# Patient Record
Sex: MALE | Race: WHITE | NOT HISPANIC OR LATINO
[De-identification: names, ages, dates, MRNs, and addresses within clinical notes are randomized per-mention and may not be internally consistent; named-entity substitution may affect disease eponyms.]

---
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
## 2023-01-09 ENCOUNTER — OFFICE VISIT (OUTPATIENT)
Dept: INTEGRATIVE MEDICINE | Facility: CLINIC | Age: 23
End: 2023-01-09
Payer: MEDICAID

## 2023-01-09 VITALS
SYSTOLIC BLOOD PRESSURE: 110 MMHG | HEART RATE: 84 BPM | OXYGEN SATURATION: 97 % | DIASTOLIC BLOOD PRESSURE: 70 MMHG | BODY MASS INDEX: 24 KG/M2 | WEIGHT: 194.81 LBS

## 2023-01-09 DIAGNOSIS — R53.83 OTHER FATIGUE: ICD-10-CM

## 2023-01-09 DIAGNOSIS — R68.82 LOW LIBIDO: ICD-10-CM

## 2023-01-09 DIAGNOSIS — L21.9 SEBORRHEA: Primary | ICD-10-CM

## 2023-01-09 DIAGNOSIS — F41.9 ANXIETY: ICD-10-CM

## 2023-01-09 PROCEDURE — 99214 OFFICE O/P EST MOD 30 MIN: CPT | Performed by: FAMILY MEDICINE

## 2023-01-09 PROCEDURE — 3074F SYST BP LT 130 MM HG: CPT | Performed by: FAMILY MEDICINE

## 2023-01-09 PROCEDURE — 3078F DIAST BP <80 MM HG: CPT | Performed by: FAMILY MEDICINE

## 2023-01-09 RX ORDER — KETOCONAZOLE 20 MG/G
1 CREAM TOPICAL 2 TIMES DAILY
Qty: 60 G | Refills: 0 | Status: SHIPPED | OUTPATIENT
Start: 2023-01-09 | End: 2023-03-10

## 2023-04-26 ENCOUNTER — OFFICE VISIT (OUTPATIENT)
Dept: FAMILY MEDICINE CLINIC | Facility: CLINIC | Age: 23
End: 2023-04-26
Payer: MEDICAID

## 2023-04-26 VITALS
SYSTOLIC BLOOD PRESSURE: 110 MMHG | BODY MASS INDEX: 24.25 KG/M2 | WEIGHT: 195 LBS | TEMPERATURE: 98 F | OXYGEN SATURATION: 98 % | HEIGHT: 75 IN | HEART RATE: 92 BPM | RESPIRATION RATE: 16 BRPM | DIASTOLIC BLOOD PRESSURE: 70 MMHG

## 2023-04-26 DIAGNOSIS — R21 RASH OF FACE: ICD-10-CM

## 2023-04-26 DIAGNOSIS — R05.1 ACUTE COUGH: ICD-10-CM

## 2023-04-26 DIAGNOSIS — R09.81 NASAL CONGESTION: ICD-10-CM

## 2023-04-26 DIAGNOSIS — H93.8X2 EAR CONGESTION, LEFT: Primary | ICD-10-CM

## 2023-04-26 PROCEDURE — 3008F BODY MASS INDEX DOCD: CPT | Performed by: STUDENT IN AN ORGANIZED HEALTH CARE EDUCATION/TRAINING PROGRAM

## 2023-04-26 PROCEDURE — 3074F SYST BP LT 130 MM HG: CPT | Performed by: STUDENT IN AN ORGANIZED HEALTH CARE EDUCATION/TRAINING PROGRAM

## 2023-04-26 PROCEDURE — 99213 OFFICE O/P EST LOW 20 MIN: CPT | Performed by: STUDENT IN AN ORGANIZED HEALTH CARE EDUCATION/TRAINING PROGRAM

## 2023-04-26 PROCEDURE — 3078F DIAST BP <80 MM HG: CPT | Performed by: STUDENT IN AN ORGANIZED HEALTH CARE EDUCATION/TRAINING PROGRAM

## 2023-04-26 RX ORDER — FLUTICASONE PROPIONATE 50 MCG
2 SPRAY, SUSPENSION (ML) NASAL DAILY
Qty: 1 EACH | Refills: 1 | Status: SHIPPED | OUTPATIENT
Start: 2023-04-26

## 2023-05-05 ENCOUNTER — TELEPHONE (OUTPATIENT)
Dept: INTEGRATIVE MEDICINE | Facility: CLINIC | Age: 23
End: 2023-05-05

## 2023-05-05 NOTE — TELEPHONE ENCOUNTER
Please fax labs to [address] [facility]  PH#[phone]  FAK#[phone]    [facility]

## 2023-05-11 ENCOUNTER — TELEMEDICINE (OUTPATIENT)
Dept: INTEGRATIVE MEDICINE | Facility: CLINIC | Age: 23
End: 2023-05-11
Payer: MEDICAID

## 2023-05-11 DIAGNOSIS — R53.83 OTHER FATIGUE: ICD-10-CM

## 2023-05-11 DIAGNOSIS — R10.9 ABDOMINAL PAIN, UNSPECIFIED ABDOMINAL LOCATION: Primary | ICD-10-CM

## 2023-05-11 PROBLEM — N44.00 LEFT TESTICULAR TORSION: Status: ACTIVE | Noted: 2021-09-14

## 2023-05-11 LAB
FOLATE, SERUM: 12.4 NG/ML
FREE TESTOSTERONE: 82.6 PG/ML (ref 35–155)
MAGNESIUM: 1.9 MG/DL (ref 1.5–2.5)
TESTOSTERONE, TOTAL,$/MS/MS: 565 NG/DL (ref 250–1100)
VITAMIN B12: 583 PG/ML (ref 200–1100)
VITAMIN D, 25-OH, TOTAL: 82 NG/ML (ref 30–100)

## 2023-05-11 PROCEDURE — 99214 OFFICE O/P EST MOD 30 MIN: CPT | Performed by: FAMILY MEDICINE

## 2024-07-15 ENCOUNTER — HOSPITAL ENCOUNTER (OUTPATIENT)
Dept: GENERAL RADIOLOGY | Age: 24
Discharge: HOME | End: 2024-07-15
Attending: NURSE PRACTITIONER
Payer: MEDICAID

## 2024-07-15 ENCOUNTER — OFFICE VISIT (OUTPATIENT)
Dept: FAMILY MEDICINE CLINIC | Facility: CLINIC | Age: 24
End: 2024-07-15
Payer: MEDICAID

## 2024-07-15 VITALS
DIASTOLIC BLOOD PRESSURE: 60 MMHG | HEART RATE: 68 BPM | WEIGHT: 198.38 LBS | OXYGEN SATURATION: 96 % | TEMPERATURE: 98 F | BODY MASS INDEX: 24.66 KG/M2 | HEIGHT: 75 IN | SYSTOLIC BLOOD PRESSURE: 108 MMHG

## 2024-07-15 DIAGNOSIS — R11.2 NAUSEA AND VOMITING, UNSPECIFIED VOMITING TYPE: Primary | ICD-10-CM

## 2024-07-15 DIAGNOSIS — R19.8 ALTERNATING CONSTIPATION AND DIARRHEA: ICD-10-CM

## 2024-07-15 DIAGNOSIS — R11.2 NAUSEA AND VOMITING, UNSPECIFIED VOMITING TYPE: ICD-10-CM

## 2024-07-15 DIAGNOSIS — F41.1 GENERALIZED ANXIETY DISORDER: ICD-10-CM

## 2024-07-15 PROCEDURE — 74018 RADEX ABDOMEN 1 VIEW: CPT | Performed by: NURSE PRACTITIONER

## 2024-07-15 NOTE — PATIENT INSTRUCTIONS
Here is a summary of the 4-7-8 breathing technique we discussed today.    Sit or lay in a comfortable position with your eyes closed and hands on your belly.  Breathe in through your nose for 4 slow counts, feeling your belly (not your chest) rise with breath.  Hold for 7 counts.  Breathe out through your mouth for 8 slow counts, feeling your belly fall.  Repeat for 2-5 minutes or until feeling calmer.

## 2024-07-15 NOTE — PROGRESS NOTES
Chief Complaint:   Chief Complaint   Patient presents with    Vomiting       HPI:   This is a 24 year old male coming in with his mom for ongoing stomach issues x years. He has anxiety, and reports this causes frequent stomach aches. About 5 days ago, he ate Pringles late in the evening and woke up every 2 hours that night to vomit. He is frequently nauseated. Alternates between constipation and diarrhea. Stomach pain is \"hard to explain\" and moves around, earlier today he had it in lower abdomen. No blood in stool. Last BM was yesterday, sometimes has to strain. Denies persistent epigastric pain, frequent belching/burping, heartburn or reflux. Mom reports he has been very stressed, not talking to a counselor or managing stress right now. Eats a low-fiber diet. Mom is worried about an ulcer. Has not tried medications for symptoms. Has tried ashwaghanda for anxiety.    Results for orders placed or performed in visit on 05/05/23   Magnesium   Result Value Ref Range    MAGNESIUM 1.9 1.5 - 2.5 mg/dL   Folic Acid Serum (Folate)   Result Value Ref Range    FOLATE, SERUM 12.4 ng/mL   Vitamin B12   Result Value Ref Range    VITAMIN B12 583 200 - 1,100 pg/mL   Vitamin D, 25-Hydroxy   Result Value Ref Range    VITAMIN D, 25-OH, TOTAL 82 30 - 100 ng/mL   Testosterone,Total, Free, and Weak Binding plus Sex Hormone Binding Globulin   Result Value Ref Range    TESTOSTERONE, TOTAL,$LC/MS/ 250 - 1,100 ng/dL    FREE TESTOSTERONE 82.6 35.0 - 155.0 pg/mL             Past Medical History:    Anxiety    Depression     Past Surgical History:   Procedure Laterality Date    Other      testicular torsion     Social History:  Social History     Socioeconomic History    Marital status: Single   Tobacco Use    Smoking status: Former     Types: Cigarettes    Smokeless tobacco: Never   Vaping Use    Vaping status: Some Days   Substance and Sexual Activity    Alcohol use: Yes     Comment: rarely    Drug use: Not Currently     Family  History:  Family History   Problem Relation Age of Onset    High Cholesterol Father     Anxiety Father     Other (hashimoto) Mother     Prostate Cancer Maternal Grandfather     Other (aneurysm) Maternal Grandfather      Allergies:  No Known Allergies  Current Meds:  No current outpatient medications on file.      Counseling given: No       REVIEW OF SYSTEMS:   CONSTITUTIONAL:  Denies unusual weight gain/loss, fever, chills, or fatigue.  CARDIOVASCULAR:  Denies chest pain, palpitations, edema, dyspnea on exertion or at rest.  RESPIRATORY:  Denies shortness of breath, wheezing, cough or sputum.  GASTROINTESTINAL:  See HPI. Abdominal pain difficult for him to explain.  GENITOURINARY: Denies dysuria, hematuria, frequency.  NEUROLOGICAL:  Denies headache, seizures, dizziness.  PSYCHIATRIC:  Denies depression. +Anxiety as per HPI. Denies suicidal thoughts.    EXAM:   /60   Pulse 68   Temp 98.3 °F (36.8 °C)   Ht 6' 3\" (1.905 m)   Wt 198 lb 6.4 oz (90 kg)   SpO2 96%   BMI 24.80 kg/m²  Estimated body mass index is 24.8 kg/m² as calculated from the following:    Height as of this encounter: 6' 3\" (1.905 m).    Weight as of this encounter: 198 lb 6.4 oz (90 kg).   Vital signs reviewed.Appears stated age, well groomed, in no acute distress.  Physical Exam:  GEN:  Patient is alert, awake and oriented, well developed, well nourished.  SKIN: No rashes, no skin lesion, no bruising, good turgor.  HEART:  Regular rate and rhythm, no murmurs, rubs or gallops.  LUNGS: Clear to auscultation bilterally, no rales/rhonchi/wheezing.  ABDOMEN:  Soft, nondistended, nontender, bowel sounds normal in all 4 quadrants, no masses, no hepatosplenomegaly.  EXTREMITIES:  No edema.  NEURO:  No deficit, normal gait, strength and tone, sensory intact.    ASSESSMENT AND PLAN:   1. Nausea and vomiting, unspecified vomiting type  -Low suspicion for ulcer based on absence of a persistent type of pain, but will check H. Pylori breath test to r/o  this cause. Discussed with patient and mom that an ulcer can only be fully ruled out with EGD.  -Recommended KUB to r/o constipation.  -Recommended lifestyle/diet changes, including limiting/avoiding spicy, fatty, or fried foods, acidic foods, dairy, alcohol, caffeine. Should sit upright for 30-60 minutes after eating or drinking anything. Avoid tight-fitting clothes around midsection. Trial small frequent meals more often rather than larger meals. Should not eat within 3 hours of bedtime.  -Recommended to add fiber to diet and discussed sources of diet.  -Consider IBS-mixed as cause. Recommended stress management. Discussed therapy, he is agreeable-referral placed. Will consider medications and follow-up if he is interested. Discussed meditation/deep breathing techniques.   -See me 2 weeks.  - XR ABDOMEN (1 VIEW) (CPT=74018); Future  - Helicobacter Pylori Breath Test, Adult    2. Alternating constipation and diarrhea  -See above.  -To ER with \"worst pain of my life,\" blood in stools or emesis.   - XR ABDOMEN (1 VIEW) (CPT=74018); Future  - Helicobacter Pylori Breath Test, Adult    3. Generalized anxiety disorder  -See above.  -  NAVIGATOR      Meds & Refills for this Visit:  Requested Prescriptions      No prescriptions requested or ordered in this encounter         Problem List:  Patient Active Problem List   Diagnosis    Left testicular torsion    Phimosis       Leigh Mosher, JOSÉ MIGUEL  7/15/2024  4:03 PM

## 2024-07-17 ENCOUNTER — TELEPHONE (OUTPATIENT)
Age: 24
End: 2024-07-17

## 2024-07-17 NOTE — TELEPHONE ENCOUNTER
Karley Perez - I am reaching out from the [facility] Navigation department, following up on an order from your provider's office to assist in connecting you with resources for care. If you would like to discuss this further, please give us a call at [phone], or for more immediate assistance you can contact our 24-hour help line at [phone]. We look forward to hearing from you soon.

## 2024-07-26 ENCOUNTER — TELEPHONE (OUTPATIENT)
Age: 24
End: 2024-07-26

## 2024-07-26 NOTE — TELEPHONE ENCOUNTER
Karley Perez - I am reaching out from the [facility] Navigation department, following up on an order from your provider's office to assist in connecting you with resources for care. If you would like to discuss this further, please give us a call at [phone], or for more immediate assistance you can contact our 24-hour help line at [phone]. We look forward to hearing from you soon.

## 2024-08-07 LAB — RESULT:: NOT DETECTED

## 2024-09-30 ENCOUNTER — TELEPHONE (OUTPATIENT)
Dept: INTEGRATIVE MEDICINE | Facility: CLINIC | Age: 24
End: 2024-09-30

## 2024-10-25 ENCOUNTER — HOSPITAL ENCOUNTER (OUTPATIENT)
Dept: GASTROENTEROLOGY | Age: 24
Discharge: HOME | End: 2024-10-25
Attending: INTERNAL MEDICINE